# Patient Record
Sex: FEMALE | Race: WHITE | NOT HISPANIC OR LATINO | Employment: FULL TIME | URBAN - METROPOLITAN AREA
[De-identification: names, ages, dates, MRNs, and addresses within clinical notes are randomized per-mention and may not be internally consistent; named-entity substitution may affect disease eponyms.]

---

## 2024-10-03 ENCOUNTER — OFFICE VISIT (OUTPATIENT)
Dept: INTERNAL MEDICINE | Facility: CLINIC | Age: 64
End: 2024-10-03
Payer: MEDICARE

## 2024-10-03 ENCOUNTER — LAB VISIT (OUTPATIENT)
Dept: LAB | Facility: HOSPITAL | Age: 64
End: 2024-10-03
Attending: FAMILY MEDICINE
Payer: MEDICARE

## 2024-10-03 ENCOUNTER — TELEPHONE (OUTPATIENT)
Dept: OBSTETRICS AND GYNECOLOGY | Facility: CLINIC | Age: 64
End: 2024-10-03
Payer: MEDICARE

## 2024-10-03 VITALS
WEIGHT: 157.31 LBS | OXYGEN SATURATION: 95 % | SYSTOLIC BLOOD PRESSURE: 129 MMHG | HEART RATE: 80 BPM | DIASTOLIC BLOOD PRESSURE: 57 MMHG | TEMPERATURE: 96 F | HEIGHT: 65 IN | BODY MASS INDEX: 26.21 KG/M2

## 2024-10-03 DIAGNOSIS — E66.3 OVERWEIGHT: ICD-10-CM

## 2024-10-03 DIAGNOSIS — N89.8 VAGINAL IRRITATION: ICD-10-CM

## 2024-10-03 DIAGNOSIS — R42 DIZZINESS: ICD-10-CM

## 2024-10-03 DIAGNOSIS — N30.00 ACUTE CYSTITIS WITHOUT HEMATURIA: Primary | ICD-10-CM

## 2024-10-03 DIAGNOSIS — R53.83 FATIGUE, UNSPECIFIED TYPE: ICD-10-CM

## 2024-10-03 DIAGNOSIS — F17.211 CIGARETTE NICOTINE DEPENDENCE IN REMISSION: ICD-10-CM

## 2024-10-03 DIAGNOSIS — Z00.00 ANNUAL PHYSICAL EXAM: ICD-10-CM

## 2024-10-03 LAB
BILIRUB SERPL-MCNC: NORMAL MG/DL
BLOOD URINE, POC: NORMAL
CLARITY, POC UA: CLEAR
COLOR, POC UA: YELLOW
GLUCOSE UR QL STRIP: NORMAL
KETONES UR QL STRIP: NORMAL
LEUKOCYTE ESTERASE URINE, POC: NORMAL
NITRITE, POC UA: NORMAL
PH, POC UA: 7
PROTEIN, POC: NORMAL
SPECIFIC GRAVITY, POC UA: 1.01
UROBILINOGEN, POC UA: 0.2

## 2024-10-03 PROCEDURE — 81002 URINALYSIS NONAUTO W/O SCOPE: CPT | Mod: PBBFAC,PN | Performed by: FAMILY MEDICINE

## 2024-10-03 PROCEDURE — 85025 COMPLETE CBC W/AUTO DIFF WBC: CPT | Performed by: FAMILY MEDICINE

## 2024-10-03 PROCEDURE — 99204 OFFICE O/P NEW MOD 45 MIN: CPT | Mod: S$PBB,,, | Performed by: FAMILY MEDICINE

## 2024-10-03 PROCEDURE — 99204 OFFICE O/P NEW MOD 45 MIN: CPT | Mod: PBBFAC,PN | Performed by: FAMILY MEDICINE

## 2024-10-03 PROCEDURE — 84443 ASSAY THYROID STIM HORMONE: CPT | Performed by: FAMILY MEDICINE

## 2024-10-03 PROCEDURE — 99999 PR PBB SHADOW E&M-NEW PATIENT-LVL IV: CPT | Mod: PBBFAC,,, | Performed by: FAMILY MEDICINE

## 2024-10-03 PROCEDURE — 99999PBSHW POCT URINE DIPSTICK WITHOUT MICROSCOPE: Mod: PBBFAC,,,

## 2024-10-03 PROCEDURE — 80061 LIPID PANEL: CPT | Performed by: FAMILY MEDICINE

## 2024-10-03 PROCEDURE — 87086 URINE CULTURE/COLONY COUNT: CPT | Performed by: FAMILY MEDICINE

## 2024-10-03 PROCEDURE — 80053 COMPREHEN METABOLIC PANEL: CPT | Performed by: FAMILY MEDICINE

## 2024-10-03 PROCEDURE — 36415 COLL VENOUS BLD VENIPUNCTURE: CPT | Mod: PN | Performed by: FAMILY MEDICINE

## 2024-10-03 RX ORDER — SULFAMETHOXAZOLE AND TRIMETHOPRIM 800; 160 MG/1; MG/1
1 TABLET ORAL 2 TIMES DAILY
Qty: 6 TABLET | Refills: 0 | Status: SHIPPED | OUTPATIENT
Start: 2024-10-03 | End: 2024-10-06

## 2024-10-03 RX ORDER — CETIRIZINE HYDROCHLORIDE 10 MG/1
10 TABLET ORAL DAILY
COMMUNITY

## 2024-10-03 NOTE — PROGRESS NOTES
Subjective:       Patient ID: Ara Matthews is a 64 y.o. female.    Chief Complaint: Dizziness, Vaginal Itching, Abdominal Pain, and Fatigue    Ara Matthews is a 64 y.o. female who presents to clinic for Dizziness, Vaginal Itching, Abdominal Pain, and Fatigue. She reports a couple episodes of dizziness with symptoms in the evening, denies syncope and has been taking it easy. Reports symptoms last summer due to the heat and feeling dehydrated.   Reports overactive bladder symptoms with increased frequency and urgency.         Review of Systems   Constitutional:  Negative for chills and fever.   HENT:  Negative for congestion, rhinorrhea and sore throat.    Respiratory:  Negative for cough, shortness of breath and wheezing.    Cardiovascular:  Negative for chest pain, palpitations and leg swelling.   Gastrointestinal:  Negative for abdominal pain, constipation, diarrhea, nausea and vomiting.   Genitourinary:  Negative for dysuria, frequency and urgency.   Neurological:  Positive for dizziness. Negative for headaches.   Psychiatric/Behavioral:  Negative for dysphoric mood.        Objective:      Physical Exam  Vitals reviewed.   HENT:      Head: Normocephalic and atraumatic.      Nose: No congestion or rhinorrhea.   Eyes:      General: No scleral icterus.        Right eye: No discharge.         Left eye: No discharge.      Extraocular Movements: Extraocular movements intact.      Conjunctiva/sclera: Conjunctivae normal.      Pupils: Pupils are equal, round, and reactive to light.   Cardiovascular:      Rate and Rhythm: Normal rate and regular rhythm.      Heart sounds: No murmur heard.     No friction rub. No gallop.   Pulmonary:      Effort: Pulmonary effort is normal. No respiratory distress.      Breath sounds: Normal breath sounds. No stridor. No wheezing or rhonchi.   Abdominal:      General: Bowel sounds are normal. There is no distension.      Palpations: Abdomen is soft. There is no mass.       Tenderness: There is no abdominal tenderness. There is no right CVA tenderness, left CVA tenderness, guarding or rebound.      Hernia: No hernia is present.   Musculoskeletal:      Right lower leg: No edema.      Left lower leg: No edema.   Skin:     General: Skin is warm.   Neurological:      General: No focal deficit present.      Mental Status: She is alert.   Psychiatric:         Mood and Affect: Mood normal.         Behavior: Behavior normal.         Assessment:       1. Acute cystitis without hematuria    2. Vaginal irritation    3. Dizziness    4. Fatigue, unspecified type    5. Cigarette nicotine dependence in remission    6. Overweight        Plan:   1. Acute cystitis without hematuria  -     POCT URINE DIPSTICK WITHOUT MICROSCOPE  -     sulfamethoxazole-trimethoprim 800-160mg (BACTRIM DS) 800-160 mg Tab; Take 1 tablet by mouth 2 (two) times daily. for 3 days  Dispense: 6 tablet; Refill: 0  -     CBC Auto Differential; Future; Expected date: 10/03/2024  -     Comprehensive Metabolic Panel; Future; Expected date: 10/03/2024  -     CULTURE, URINE    2. Vaginal irritation  -     Ambulatory referral/consult to Obstetrics / Gynecology; Future; Expected date: 10/10/2024    3. Dizziness  -     CBC Auto Differential; Future; Expected date: 10/03/2024  -     Comprehensive Metabolic Panel; Future; Expected date: 10/03/2024    4. Fatigue, unspecified type  -     TSH; Future; Expected date: 10/03/2024    5. Cigarette nicotine dependence in remission  -     LIPID PANEL; Future; Expected date: 10/03/2024    6. Overweight  -     LIPID PANEL; Future; Expected date: 10/03/2024           Future Appointments   Date Time Provider Department Center   12/19/2024 10:30 AM Jennifer Car NP [facility] ArtJavyfinn Solorzano MD  [facility] [address]  [place] [address]  [phone]

## 2024-10-03 NOTE — TELEPHONE ENCOUNTER
Lvm for pt to return call to get appointment scheduled with our department.   Received referral for Vaginal irritation.

## 2024-10-04 LAB
ALBUMIN SERPL BCP-MCNC: 4.2 G/DL (ref 3.5–5.2)
ALP SERPL-CCNC: 72 U/L (ref 55–135)
ALT SERPL W/O P-5'-P-CCNC: 19 U/L (ref 10–44)
ANION GAP SERPL CALC-SCNC: 10 MMOL/L (ref 8–16)
AST SERPL-CCNC: 19 U/L (ref 10–40)
BASOPHILS # BLD AUTO: 0.04 K/UL (ref 0–0.2)
BASOPHILS NFR BLD: 0.6 % (ref 0–1.9)
BILIRUB SERPL-MCNC: 0.5 MG/DL (ref 0.1–1)
BUN SERPL-MCNC: 20 MG/DL (ref 8–23)
CALCIUM SERPL-MCNC: 9.8 MG/DL (ref 8.7–10.5)
CHLORIDE SERPL-SCNC: 104 MMOL/L (ref 95–110)
CHOLEST SERPL-MCNC: 193 MG/DL (ref 120–199)
CHOLEST/HDLC SERPL: 2.5 {RATIO} (ref 2–5)
CO2 SERPL-SCNC: 28 MMOL/L (ref 23–29)
CREAT SERPL-MCNC: 0.8 MG/DL (ref 0.5–1.4)
DIFFERENTIAL METHOD BLD: ABNORMAL
EOSINOPHIL # BLD AUTO: 0.1 K/UL (ref 0–0.5)
EOSINOPHIL NFR BLD: 1 % (ref 0–8)
ERYTHROCYTE [DISTWIDTH] IN BLOOD BY AUTOMATED COUNT: 12.8 % (ref 11.5–14.5)
EST. GFR  (NO RACE VARIABLE): >60 ML/MIN/1.73 M^2
GLUCOSE SERPL-MCNC: 100 MG/DL (ref 70–110)
HCT VFR BLD AUTO: 44.3 % (ref 37–48.5)
HDLC SERPL-MCNC: 76 MG/DL (ref 40–75)
HDLC SERPL: 39.4 % (ref 20–50)
HGB BLD-MCNC: 14.1 G/DL (ref 12–16)
IMM GRANULOCYTES # BLD AUTO: 0.01 K/UL (ref 0–0.04)
IMM GRANULOCYTES NFR BLD AUTO: 0.1 % (ref 0–0.5)
LDLC SERPL CALC-MCNC: 103.8 MG/DL (ref 63–159)
LYMPHOCYTES # BLD AUTO: 2 K/UL (ref 1–4.8)
LYMPHOCYTES NFR BLD: 28.5 % (ref 18–48)
MCH RBC QN AUTO: 30.4 PG (ref 27–31)
MCHC RBC AUTO-ENTMCNC: 31.8 G/DL (ref 32–36)
MCV RBC AUTO: 96 FL (ref 82–98)
MONOCYTES # BLD AUTO: 0.7 K/UL (ref 0.3–1)
MONOCYTES NFR BLD: 9.2 % (ref 4–15)
NEUTROPHILS # BLD AUTO: 4.4 K/UL (ref 1.8–7.7)
NEUTROPHILS NFR BLD: 60.6 % (ref 38–73)
NONHDLC SERPL-MCNC: 117 MG/DL
NRBC BLD-RTO: 0 /100 WBC
PLATELET # BLD AUTO: 267 K/UL (ref 150–450)
PMV BLD AUTO: 11.4 FL (ref 9.2–12.9)
POTASSIUM SERPL-SCNC: 4.3 MMOL/L (ref 3.5–5.1)
PROT SERPL-MCNC: 7.4 G/DL (ref 6–8.4)
RBC # BLD AUTO: 4.64 M/UL (ref 4–5.4)
SODIUM SERPL-SCNC: 142 MMOL/L (ref 136–145)
TRIGL SERPL-MCNC: 66 MG/DL (ref 30–150)
TSH SERPL DL<=0.005 MIU/L-ACNC: 1.21 UIU/ML (ref 0.4–4)
WBC # BLD AUTO: 7.17 K/UL (ref 3.9–12.7)

## 2024-10-05 LAB — BACTERIA UR CULT: NORMAL

## 2024-10-28 ENCOUNTER — TELEPHONE (OUTPATIENT)
Dept: OBSTETRICS AND GYNECOLOGY | Facility: CLINIC | Age: 64
End: 2024-10-28
Payer: MEDICARE

## 2025-03-19 ENCOUNTER — CLINICAL SUPPORT (OUTPATIENT)
Dept: INTERNAL MEDICINE | Facility: CLINIC | Age: 65
End: 2025-03-19
Payer: MEDICARE

## 2025-03-19 ENCOUNTER — OFFICE VISIT (OUTPATIENT)
Dept: INTERNAL MEDICINE | Facility: CLINIC | Age: 65
End: 2025-03-19
Payer: MEDICARE

## 2025-03-19 VITALS
HEART RATE: 94 BPM | RESPIRATION RATE: 18 BRPM | HEIGHT: 65 IN | TEMPERATURE: 98 F | SYSTOLIC BLOOD PRESSURE: 90 MMHG | WEIGHT: 158.5 LBS | BODY MASS INDEX: 26.41 KG/M2 | OXYGEN SATURATION: 98 % | DIASTOLIC BLOOD PRESSURE: 68 MMHG

## 2025-03-19 DIAGNOSIS — R00.0 TACHYCARDIA: Primary | ICD-10-CM

## 2025-03-19 DIAGNOSIS — R79.9 ABNORMAL FINDING OF BLOOD CHEMISTRY, UNSPECIFIED: ICD-10-CM

## 2025-03-19 DIAGNOSIS — R00.0 TACHYCARDIA: ICD-10-CM

## 2025-03-19 DIAGNOSIS — N32.81 OVERACTIVE BLADDER: ICD-10-CM

## 2025-03-19 DIAGNOSIS — R63.1 EXCESSIVE THIRST: ICD-10-CM

## 2025-03-19 DIAGNOSIS — E86.0 DEHYDRATION: Primary | ICD-10-CM

## 2025-03-19 LAB
BILIRUB SERPL-MCNC: NORMAL MG/DL
BLOOD URINE, POC: NORMAL
CLARITY, POC UA: CLEAR
COLOR, POC UA: YELLOW
GLUCOSE SERPL-MCNC: 109 MG/DL (ref 70–110)
GLUCOSE UR QL STRIP: NORMAL
KETONES UR QL STRIP: NORMAL
LEUKOCYTE ESTERASE URINE, POC: NORMAL
NITRITE, POC UA: NORMAL
PH, POC UA: 6
PROTEIN, POC: NORMAL
SPECIFIC GRAVITY, POC UA: 1.02
UROBILINOGEN, POC UA: 0.2

## 2025-03-19 PROCEDURE — 99214 OFFICE O/P EST MOD 30 MIN: CPT | Mod: PBBFAC,PN | Performed by: FAMILY MEDICINE

## 2025-03-19 PROCEDURE — 93005 ELECTROCARDIOGRAM TRACING: CPT | Mod: PBBFAC,PN | Performed by: INTERNAL MEDICINE

## 2025-03-19 PROCEDURE — 82962 GLUCOSE BLOOD TEST: CPT | Mod: PBBFAC,PN | Performed by: FAMILY MEDICINE

## 2025-03-19 PROCEDURE — 99215 OFFICE O/P EST HI 40 MIN: CPT | Mod: S$PBB,,, | Performed by: FAMILY MEDICINE

## 2025-03-19 PROCEDURE — 81002 URINALYSIS NONAUTO W/O SCOPE: CPT | Mod: PBBFAC,PN | Performed by: FAMILY MEDICINE

## 2025-03-19 PROCEDURE — 99999 PR PBB SHADOW E&M-EST. PATIENT-LVL IV: CPT | Mod: PBBFAC,,, | Performed by: FAMILY MEDICINE

## 2025-03-19 PROCEDURE — 99999PBSHW POCT URINE DIPSTICK WITHOUT MICROSCOPE: Mod: PBBFAC,,,

## 2025-03-19 PROCEDURE — 99999PBSHW POCT GLUCOSE, HAND-HELD DEVICE: Mod: PBBFAC,,,

## 2025-03-19 PROCEDURE — 93010 ELECTROCARDIOGRAM REPORT: CPT | Mod: S$PBB,,, | Performed by: INTERNAL MEDICINE

## 2025-03-19 NOTE — PROGRESS NOTES
Pt tolerated EKG without any signs of distress or complications. It was transmitted for further reading and documentation. Provider recommended pt visit ED due to symptomatic dehydration.

## 2025-03-19 NOTE — PROGRESS NOTES
"Subjective:       Patient ID: Ara Matthews is a 65 y.o. female.    Chief Complaint: Tachycardia    Ara Matthews is 65 y.o. female who present to clinic with complaints of tachycardia, dizziness. She feels generally unwell, "I feel like dookie".She has been checking her blood pressure periodically since Friday and her daughter who is a nurse assessed her symptoms on Friday and noted an irregular heart beat. Her pulse has ranged from 90-130s. She reports chronic frequent urination and thirst. Has family history of heart disease, family history of diabetes. Denies any recent injuries or falls. No new medications            Review of Systems   Constitutional:  Negative for chills and fever.   HENT:  Negative for congestion, rhinorrhea and sore throat.    Respiratory:  Negative for cough, shortness of breath and wheezing.    Cardiovascular:  Positive for palpitations. Negative for chest pain and leg swelling.   Gastrointestinal:  Negative for abdominal pain, constipation, diarrhea, nausea and vomiting.   Genitourinary:  Positive for frequency. Negative for dysuria and urgency.   Neurological:  Positive for dizziness and light-headedness. Negative for headaches.   Psychiatric/Behavioral:  Negative for dysphoric mood.        Objective:      Physical Exam  Vitals reviewed.   Constitutional:       General: She is not in acute distress.     Appearance: She is ill-appearing. She is not toxic-appearing.   HENT:      Head: Normocephalic and atraumatic.      Nose: No congestion or rhinorrhea.   Eyes:      General: No scleral icterus.        Right eye: No discharge.         Left eye: No discharge.      Extraocular Movements: Extraocular movements intact.      Conjunctiva/sclera: Conjunctivae normal.      Pupils: Pupils are equal, round, and reactive to light.   Cardiovascular:      Rate and Rhythm: Regular rhythm. Tachycardia present.      Heart sounds: No murmur heard.     No friction rub. No gallop.   Pulmonary:      " Effort: Pulmonary effort is normal. No respiratory distress.      Breath sounds: Normal breath sounds. No stridor. No wheezing or rhonchi.   Abdominal:      General: Bowel sounds are normal.      Palpations: Abdomen is soft.   Musculoskeletal:      Right lower leg: No edema.      Left lower leg: No edema.   Skin:     General: Skin is warm.      Comments: Varicose veins present bilateral lower extremity    Neurological:      General: No focal deficit present.      Mental Status: She is alert.   Psychiatric:         Mood and Affect: Mood normal.         Behavior: Behavior normal.         Assessment/Plan:       1. Tachycardia    2. Overactive bladder    3. Abnormal finding of blood chemistry, unspecified    4. Excessive thirst      1. Dehydration (Primary)  Acute, uncontrolled, recommend referral to ED for IV fluid resuscitation and cardiac monitoring   - Refer to Emergency Dept.    2. Tachycardia  no previous EKG in system for comparison rhythm is normal sinus, incomplete right bundle branch, symptoms potentially secondary to paroxysmal afib vs dehydration vs thyroid dysfunction, recommend patient seek care in ED for care  - IN OFFICE EKG 12-LEAD (to Muse)  - CBC Auto Differential; Future  - Comprehensive Metabolic Panel; Future  - Hemoglobin A1C; Future  - Lipid Panel; Future  - TSH; Future  - Ambulatory referral/consult to Cardiology; Future  - IN OFFICE EKG 12-LEAD (to Muse)    3. Overactive bladder  Chronic, uncontrolled, will assess for UTI, may benefit from new outpatient urology referral  - POCT URINE DIPSTICK WITHOUT MICROSCOPE    4. Abnormal finding of blood chemistry, unspecified  - Hemoglobin A1C; Future  - Lipid Panel; Future    5. Excessive thirst  Acute, uncontrolled, will assess glucose for hyperglycemia   - POCT Glucose, Hand-Held Device               Future Appointments   Date Time Provider Department [place]   3/20/2025  8:20 AM FLORA BENJAMIN   4/2/2025  2:20 PM Christie Solorzano  MD [facility]   4/22/2025  4:00 PM Palmer Pham MD ON CARDIO  Medical C       Christie Solorzano MD  [facility] [address]  [place] [address]  [phone]      This note was generated with the assistance of ambient listening technology. Verbal consent was obtained by the patient and accompanying visitor(s) for the recording of patient appointment to facilitate this note. I attest to having reviewed and edited the generated note for accuracy, though some syntax or spelling errors may persist. Please contact the author of this note for any clarification.

## 2025-03-20 LAB
OHS QRS DURATION: 106 MS
OHS QTC CALCULATION: 447 MS

## 2025-03-24 ENCOUNTER — PATIENT MESSAGE (OUTPATIENT)
Dept: INTERNAL MEDICINE | Facility: CLINIC | Age: 65
End: 2025-03-24
Payer: MEDICARE

## 2025-03-27 ENCOUNTER — OFFICE VISIT (OUTPATIENT)
Dept: INTERNAL MEDICINE | Facility: CLINIC | Age: 65
End: 2025-03-27
Payer: MEDICARE

## 2025-03-27 VITALS
WEIGHT: 158.63 LBS | OXYGEN SATURATION: 96 % | SYSTOLIC BLOOD PRESSURE: 100 MMHG | RESPIRATION RATE: 18 BRPM | TEMPERATURE: 97 F | HEART RATE: 70 BPM | BODY MASS INDEX: 26.43 KG/M2 | HEIGHT: 65 IN | DIASTOLIC BLOOD PRESSURE: 72 MMHG

## 2025-03-27 DIAGNOSIS — M19.049 ARTHRITIS OF HAND, UNSPECIFIED LATERALITY: ICD-10-CM

## 2025-03-27 DIAGNOSIS — R00.0 TACHYCARDIA: Primary | ICD-10-CM

## 2025-03-27 DIAGNOSIS — M62.838 MUSCLE SPASM: ICD-10-CM

## 2025-03-27 PROCEDURE — 99214 OFFICE O/P EST MOD 30 MIN: CPT | Mod: S$PBB,,, | Performed by: FAMILY MEDICINE

## 2025-03-27 PROCEDURE — 99999 PR PBB SHADOW E&M-EST. PATIENT-LVL IV: CPT | Mod: PBBFAC,,, | Performed by: FAMILY MEDICINE

## 2025-03-27 PROCEDURE — G2211 COMPLEX E/M VISIT ADD ON: HCPCS | Mod: S$PBB,,, | Performed by: FAMILY MEDICINE

## 2025-03-27 PROCEDURE — 99214 OFFICE O/P EST MOD 30 MIN: CPT | Mod: PBBFAC,PN | Performed by: FAMILY MEDICINE

## 2025-03-27 RX ORDER — TIZANIDINE 4 MG/1
4 TABLET ORAL NIGHTLY PRN
Qty: 30 TABLET | Refills: 0 | Status: SHIPPED | OUTPATIENT
Start: 2025-03-27

## 2025-03-27 RX ORDER — ACETAMINOPHEN 500 MG
1 TABLET ORAL NIGHTLY
COMMUNITY

## 2025-03-27 NOTE — PROGRESS NOTES
Subjective:       Patient ID: Ara Matthews is a 65 y.o. female.    Chief Complaint: Tachycardia    Presents for follow up of elevated heart rate. She has gone to the ED twice for elevated heart rate with a negative cardiac work up. She is interested in referral to cardiology. Denies symptoms today. She recently purchase an Apple watch with the EKG feature.       Review of Systems   Constitutional:  Negative for chills and fever.   HENT:  Negative for congestion, rhinorrhea and sore throat.    Respiratory:  Negative for cough, shortness of breath and wheezing.    Cardiovascular:  Positive for palpitations. Negative for chest pain and leg swelling.   Gastrointestinal:  Negative for abdominal pain, constipation, diarrhea, nausea and vomiting.   Genitourinary:  Negative for dysuria, frequency and urgency.   Neurological:  Negative for headaches.   Psychiatric/Behavioral:  Negative for dysphoric mood.        Objective:      Physical Exam  Vitals reviewed.   HENT:      Head: Normocephalic and atraumatic.      Nose: No congestion or rhinorrhea.   Eyes:      General: No scleral icterus.        Right eye: No discharge.         Left eye: No discharge.      Extraocular Movements: Extraocular movements intact.      Conjunctiva/sclera: Conjunctivae normal.      Pupils: Pupils are equal, round, and reactive to light.   Cardiovascular:      Rate and Rhythm: Normal rate and regular rhythm.      Heart sounds: No murmur heard.     No friction rub. No gallop.   Pulmonary:      Effort: Pulmonary effort is normal. No respiratory distress.      Breath sounds: Normal breath sounds. No stridor. No wheezing or rhonchi.   Abdominal:      General: Bowel sounds are normal.      Palpations: Abdomen is soft.   Musculoskeletal:      Right lower leg: No edema.      Left lower leg: No edema.   Skin:     General: Skin is warm.   Neurological:      General: No focal deficit present.      Mental Status: She is alert.   Psychiatric:         Mood  and Affect: Mood normal.         Behavior: Behavior normal.         Assessment:       1. Tachycardia    2. Arthritis of hand    3. Muscle spasm        Plan:   1. Tachycardia  Intermittent issues, normal rate and rhythm during exam today, concern for possible paroxysmal arrhythmia, will advise patient to continue to monitor pulse, see cardiology as scheduled.     2. Arthritis of hand  Chronic, stable, continue use of tumeric and OTC NSAIDs PRN    3. Muscle spasm  Chronic, uncontrolled   -     tiZANidine (ZANAFLEX) 4 MG tablet; Take 1 tablet (4 mg total) by mouth nightly as needed (muscle spasm).  Dispense: 30 tablet; Refill: 0    RTC 2 months for follow up      Future Appointments   Date Time Provider Department Center   4/22/2025  4:00 PM Palmer Pham MD ON CARDIO [facility]   5/7/2025 10:40 AM Christie Solorzano MD [facility]       Christie Solorzano MD  [facility] [address]  [place] [address]  [phone]

## 2025-03-27 NOTE — PROGRESS NOTES
Subjective:       Patient ID: Ara Matthews is a 65 y.o. female.    Chief Complaint: Tachycardia    Episodic issues with elevated heart rate, last 1.5 hours, feels slight lightheaded during these episodes   She recently purchased an Apple watch which has the EKG feature. Unsure how to download information      Review of Systems   Constitutional:  Negative for chills and fever.   HENT:  Negative for congestion, rhinorrhea and sore throat.    Respiratory:  Negative for cough, shortness of breath and wheezing.    Cardiovascular:  Positive for palpitations. Negative for chest pain and leg swelling.   Gastrointestinal:  Negative for abdominal pain, constipation, diarrhea, nausea and vomiting.   Genitourinary:  Negative for dysuria, frequency and urgency.   Neurological:  Negative for headaches.   Psychiatric/Behavioral:  Negative for dysphoric mood.        Objective:      Physical Exam    Assessment:       1. Tachycardia    2. Arthritis of hand        Plan:   1. Tachycardia    2. Arthritis of hand       Considered ordering a holter monitor, however patient has EKG feature on her watch, which may capture these event they occur  - follow up with cardiology as scheduled     Future Appointments   Date Time Provider Department Center   4/2/2025  2:20 PM Christie Solorzano MD [facility]   4/22/2025  4:00 PM Palmer Pham MD ON CARDIO  Medical C       Christie Solorzano MD  [facility] [address]  [place] [address]  [phone]

## 2025-04-22 ENCOUNTER — OFFICE VISIT (OUTPATIENT)
Dept: CARDIOLOGY | Facility: CLINIC | Age: 65
End: 2025-04-22
Payer: MEDICARE

## 2025-04-22 VITALS
RESPIRATION RATE: 16 BRPM | BODY MASS INDEX: 26.81 KG/M2 | HEIGHT: 65 IN | HEART RATE: 73 BPM | DIASTOLIC BLOOD PRESSURE: 66 MMHG | WEIGHT: 160.94 LBS | OXYGEN SATURATION: 95 % | SYSTOLIC BLOOD PRESSURE: 102 MMHG

## 2025-04-22 DIAGNOSIS — R06.09 OTHER FORM OF DYSPNEA: ICD-10-CM

## 2025-04-22 DIAGNOSIS — R07.9 CHEST PAIN, UNSPECIFIED TYPE: ICD-10-CM

## 2025-04-22 DIAGNOSIS — R00.2 PALPITATIONS: ICD-10-CM

## 2025-04-22 DIAGNOSIS — I47.10 SVT (SUPRAVENTRICULAR TACHYCARDIA): Primary | ICD-10-CM

## 2025-04-22 DIAGNOSIS — R00.0 TACHYCARDIA: ICD-10-CM

## 2025-04-22 PROCEDURE — 99999 PR PBB SHADOW E&M-EST. PATIENT-LVL V: CPT | Mod: PBBFAC,,, | Performed by: INTERNAL MEDICINE

## 2025-04-22 PROCEDURE — 99215 OFFICE O/P EST HI 40 MIN: CPT | Mod: PBBFAC | Performed by: INTERNAL MEDICINE

## 2025-04-22 RX ORDER — METOPROLOL SUCCINATE 25 MG/1
12.5 TABLET, EXTENDED RELEASE ORAL NIGHTLY
Qty: 45 TABLET | Refills: 3 | Status: SHIPPED | OUTPATIENT
Start: 2025-04-22 | End: 2026-04-22

## 2025-04-22 NOTE — PROGRESS NOTES
Subjective:   Patient ID:  Ara Matthews is a 65 y.o. female who presents for cardiac consult of No chief complaint on file.      Referral by: Christie Solorzano Md  [address] [facility]  [place] [address]     Reason for consult: tachycardia       HPI  The patient came in today for cardiac consult of No chief complaint on file.    25  Ara Matthews is a 65 y.o. female pt with SVT, h/o tobacco use, arthritis, diverticulitis, h/o colon resection presents for CV eval.       ER eval  Palpitations        Pt seen here recently and was instructed to return if heart rate greater than 100. Today at 4260-2861, her heart rate was in the 140's. Heart rate in 80's on triage.     65-year-old female history of arthritis, diverticulitis, colon resection presents for palpitations.  Patient states after cleaning the kitchen she had some palpitations and slight lightheadedness similar to an episode she had last week for which she was evaluated and discharged.  Denies chest pain, shortness of breath, diaphoresis, numbness, tingling, had symptoms on arrival but while waiting in the waiting room symptoms resolved.  No results found for this or any previous visit.      Pt had recent ER eval for palpitations, elevated HR.   BP low normal today. HR 70s. BMI 26   Recent ECG - NSR, inc RBBB, LAFB   Has apple watch - ECG on there resembles SVT - V rate 160 bpm.     FH - father - had MI, was a smoker and drinker       No cardiac monitor results found for the past 12 months     Normal sinus rhythm   Incomplete right bundle branch block   Left anterior fascicular block   Abnormal ECG   No previous ECGs available   Confirmed by Palmer Pham ([id number]) on 3/20/2025 3:22:16 PM     Past Medical History:   Diagnosis Date    Arthritis     GERD (gastroesophageal reflux disease)     Urinary frequency        Past Surgical History:   Procedure Laterality Date     SECTION         Social History[1]    Family History   Problem Relation Name  Age of Onset    Cancer Maternal Grandmother          oral    Colon cancer Paternal Grandmother      Cancer Paternal Grandmother          colon CA    Heart disease Father      Stroke Neg Hx      Hypertension Neg Hx      Diabetes Maternal Uncle      Cancer Cousin Paternal         colon CA       Patient's Medications   New Prescriptions    METOPROLOL SUCCINATE (TOPROL-XL) 25 MG 24 HR TABLET    Take 0.5 tablets (12.5 mg total) by mouth every evening.   Previous Medications    CETIRIZINE (ZYRTEC) 10 MG TABLET    Take 10 mg by mouth once daily.    CINNAMON BARK (CINNAMON ORAL)    Take by mouth.    CLOTRIMAZOLE-BETAMETHASONE 1-0.05% (LOTRISONE) CREAM    APPLY TOPICALLY TWICE A DAY    FEXOFENADINE (ALLEGRA) 30 MG TABLET    Take 30 mg by mouth 2 (two) times daily as needed.     FLUTICASONE (FLONASE) 50 MCG/ACTUATION NASAL SPRAY    2 sprays by Each Nostril route daily as needed.    MELATONIN (MELATIN) 5 MG    Take 1 tablet by mouth every evening.    MULTIVITAMIN (HONEY BEARS ORAL)    Take by mouth.    OMEPRAZOLE (PRILOSEC) 40 MG CAPSULE    Take 1 capsule (40 mg total) by mouth 2 (two) times daily before meals.    TIZANIDINE (ZANAFLEX) 4 MG TABLET    Take 1 tablet (4 mg total) by mouth nightly as needed (muscle spasm).    TURMERIC ORAL    Take by mouth.   Modified Medications    No medications on file   Discontinued Medications    No medications on file       Review of Systems   Constitutional:  Positive for malaise/fatigue.   HENT: Negative.     Eyes: Negative.    Respiratory:  Positive for shortness of breath.    Cardiovascular:  Positive for palpitations. Negative for leg swelling.   Gastrointestinal: Negative.    Genitourinary: Negative.    Musculoskeletal: Negative.    Skin: Negative.    Neurological: Negative.    Endo/Heme/Allergies: Negative.    Psychiatric/Behavioral: Negative.     All 12 systems otherwise negative.      Wt Readings from Last 3 Encounters:   04/22/25 73 kg (160 lb 15 oz)   04/03/25 72.5 kg (159 lb 13.3  "oz)   03/27/25 72 kg (158 lb 9.9 oz)     Temp Readings from Last 3 Encounters:   04/03/25 96.5 °F (35.8 °C) (Tympanic)   03/27/25 96.6 °F (35.9 °C) (Tympanic)   03/19/25 98 °F (36.7 °C) (Tympanic)     BP Readings from Last 3 Encounters:   04/22/25 102/66   04/03/25 115/70   03/27/25 100/72     Pulse Readings from Last 3 Encounters:   04/22/25 73   04/03/25 64   03/27/25 70       /66   Pulse 73   Resp 16   Ht 5' 5" (1.651 m)   Wt 73 kg (160 lb 15 oz)   LMP 04/29/2003   SpO2 95%   BMI 26.78 kg/m²     Objective:   Physical Exam  Vitals and nursing note reviewed.   Constitutional:       General: She is not in acute distress.     Appearance: She is well-developed. She is not diaphoretic.   HENT:      Head: Normocephalic and atraumatic.      Nose: Nose normal.   Eyes:      General: No scleral icterus.     Conjunctiva/sclera: Conjunctivae normal.   Neck:      Thyroid: No thyromegaly.      Vascular: No JVD.   Cardiovascular:      Rate and Rhythm: Normal rate and regular rhythm.      Heart sounds: S1 normal and S2 normal. No murmur heard.     No friction rub. No gallop. No S3 or S4 sounds.   Pulmonary:      Effort: Pulmonary effort is normal. No respiratory distress.      Breath sounds: Normal breath sounds. No stridor. No wheezing or rales.   Chest:      Chest wall: No tenderness.   Abdominal:      General: Bowel sounds are normal. There is no distension.      Palpations: Abdomen is soft. There is no mass.      Tenderness: There is no abdominal tenderness. There is no rebound.   Genitourinary:     Comments: Deferred  Musculoskeletal:         General: No tenderness or deformity. Normal range of motion.      Cervical back: Normal range of motion and neck supple.   Lymphadenopathy:      Cervical: No cervical adenopathy.   Skin:     General: Skin is warm and dry.      Coloration: Skin is not pale.      Findings: No erythema or rash.   Neurological:      Mental Status: She is alert and oriented to person, place, and " time.      Motor: No abnormal muscle tone.      Coordination: Coordination normal.   Psychiatric:         Behavior: Behavior normal.         Thought Content: Thought content normal.         Judgment: Judgment normal.         Lab Results   Component Value Date     10/03/2024    K 4.3 10/03/2024     10/03/2024    CO2 28 10/03/2024    BUN 20 10/03/2024    CREATININE 0.8 10/03/2024     10/03/2024    AST 19 10/03/2024    ALT 19 10/03/2024    ALBUMIN 4.2 10/03/2024    PROT 7.4 10/03/2024    BILITOT 0.5 10/03/2024    WBC 7.17 10/03/2024    HGB Negative 2025    HGB 14.1 10/03/2024    HCT 44.3 10/03/2024    MCV 96 10/03/2024     10/03/2024    INR 1 2025    TSH 1.296 2025    TSH 1.207 10/03/2024    CHOL 193 10/03/2024    HDL 76 (H) 10/03/2024    LDLCALC 103.8 10/03/2024    TRIG 66 10/03/2024    BNP 32 2025         BNP (PG/ML)   Date Value   2025 32   2025 49     INR (no units)   Date Value   2025 1          Assessment:      1. SVT (supraventricular tachycardia)    2. Tachycardia    3. Palpitations    4. Chest pain, unspecified type    5. Other form of dyspnea        Plan:     Tachycardia, palpitations with SVT on Apple Watch  - neg CV work up in ER recently  - start Toprol 12.5 mg QHS   - increase water/liquid IV   - order exercise nuclear stress test  - order ECHO  - order 7 day vital monitor  - refer to EP - discuss ablation     2. OA - joints  - cont tx PRN       Thank you for allowing me to participate in this patient's care. Please do not hesitate to contact me with any questions or concerns. Consult note has been forwarded to the referral physician.          [1]   Social History  Tobacco Use    Smoking status: Former     Current packs/day: 0.00     Types: Cigarettes     Quit date: 2008     Years since quittin.9    Smokeless tobacco: Never   Substance Use Topics    Alcohol use: No    Drug use: No

## 2025-04-25 ENCOUNTER — TELEPHONE (OUTPATIENT)
Dept: CARDIOLOGY | Facility: HOSPITAL | Age: 65
End: 2025-04-25
Payer: MEDICARE

## 2025-04-25 NOTE — TELEPHONE ENCOUNTER
Returned call. Patient requesting to reschedule Nuclear Stress test and Echo at [facility].  Patient was instructed will send message for hospital scheduling staff to give a call, due to I don't ave hospital scheduling privileges.   Patient verbalized understanding.

## 2025-05-01 DIAGNOSIS — M62.838 MUSCLE SPASM: ICD-10-CM

## 2025-05-02 ENCOUNTER — PATIENT OUTREACH (OUTPATIENT)
Dept: ADMINISTRATIVE | Facility: HOSPITAL | Age: 65
End: 2025-05-02
Payer: MEDICARE

## 2025-05-02 DIAGNOSIS — Z12.31 ENCOUNTER FOR SCREENING MAMMOGRAM FOR MALIGNANT NEOPLASM OF BREAST: Primary | ICD-10-CM

## 2025-05-02 RX ORDER — TIZANIDINE 4 MG/1
4 TABLET ORAL NIGHTLY PRN
Qty: 30 TABLET | Refills: 0 | Status: SHIPPED | OUTPATIENT
Start: 2025-05-02

## 2025-05-07 ENCOUNTER — PATIENT MESSAGE (OUTPATIENT)
Dept: INTERNAL MEDICINE | Facility: CLINIC | Age: 65
End: 2025-05-07
Payer: MEDICARE

## 2025-05-12 ENCOUNTER — OFFICE VISIT (OUTPATIENT)
Dept: INTERNAL MEDICINE | Facility: CLINIC | Age: 65
End: 2025-05-12
Payer: MEDICARE

## 2025-05-12 VITALS
TEMPERATURE: 97 F | RESPIRATION RATE: 18 BRPM | WEIGHT: 161.19 LBS | HEART RATE: 66 BPM | SYSTOLIC BLOOD PRESSURE: 110 MMHG | OXYGEN SATURATION: 95 % | DIASTOLIC BLOOD PRESSURE: 65 MMHG | HEIGHT: 65 IN | BODY MASS INDEX: 26.85 KG/M2

## 2025-05-12 DIAGNOSIS — Z13.1 SCREENING FOR DIABETES MELLITUS: ICD-10-CM

## 2025-05-12 DIAGNOSIS — R00.0 TACHYCARDIA: ICD-10-CM

## 2025-05-12 DIAGNOSIS — Z78.0 POST-MENOPAUSAL: ICD-10-CM

## 2025-05-12 DIAGNOSIS — R73.09 ABNORMAL GLUCOSE: ICD-10-CM

## 2025-05-12 DIAGNOSIS — Z12.11 SCREENING FOR COLON CANCER: ICD-10-CM

## 2025-05-12 DIAGNOSIS — Z11.59 ENCOUNTER FOR HEPATITIS C SCREENING TEST FOR LOW RISK PATIENT: Primary | ICD-10-CM

## 2025-05-12 PROCEDURE — 99214 OFFICE O/P EST MOD 30 MIN: CPT | Mod: S$PBB,,, | Performed by: FAMILY MEDICINE

## 2025-05-12 PROCEDURE — 99215 OFFICE O/P EST HI 40 MIN: CPT | Mod: PBBFAC,PN | Performed by: FAMILY MEDICINE

## 2025-05-12 PROCEDURE — 99999 PR PBB SHADOW E&M-EST. PATIENT-LVL V: CPT | Mod: PBBFAC,,, | Performed by: FAMILY MEDICINE

## 2025-05-12 PROCEDURE — G2211 COMPLEX E/M VISIT ADD ON: HCPCS | Mod: S$PBB,,, | Performed by: FAMILY MEDICINE

## 2025-05-12 NOTE — PROGRESS NOTES
Subjective:       Patient ID: Ara Matthews is a 65 y.o. female.    Chief Complaint: Follow-up (Follow up on previous visit from cardiologist and starting new medication)    Ara Matthews is 65 y.o. female who presents for follow up. Reports doing well. Started on metoprolol by cardiology since LOV.       Review of Systems   Constitutional:  Negative for chills and fever.   HENT:  Negative for congestion, rhinorrhea and sore throat.    Respiratory:  Negative for cough, shortness of breath and wheezing.    Cardiovascular:  Negative for chest pain, palpitations and leg swelling.   Gastrointestinal:  Negative for abdominal pain, constipation, diarrhea, nausea and vomiting.   Genitourinary:  Negative for dysuria, frequency and urgency.   Neurological:  Negative for headaches.   Psychiatric/Behavioral:  Negative for dysphoric mood.        Objective:      Physical Exam  Vitals reviewed.   HENT:      Head: Normocephalic and atraumatic.      Nose: No congestion or rhinorrhea.   Eyes:      General: No scleral icterus.        Right eye: No discharge.         Left eye: No discharge.      Extraocular Movements: Extraocular movements intact.      Conjunctiva/sclera: Conjunctivae normal.      Pupils: Pupils are equal, round, and reactive to light.   Cardiovascular:      Rate and Rhythm: Normal rate and regular rhythm.      Heart sounds: No murmur heard.     No friction rub. No gallop.   Pulmonary:      Effort: Pulmonary effort is normal. No respiratory distress.      Breath sounds: Normal breath sounds. No stridor. No wheezing or rhonchi.   Abdominal:      General: Bowel sounds are normal.      Palpations: Abdomen is soft.   Musculoskeletal:      Right lower leg: No edema.      Left lower leg: No edema.   Skin:     General: Skin is warm.   Neurological:      General: No focal deficit present.      Mental Status: She is alert.   Psychiatric:         Mood and Affect: Mood normal.         Behavior: Behavior normal.          Assessment:       1. Encounter for hepatitis C screening test for low risk patient    2. Screening for colon cancer    3. Post-menopausal    4. Screening for diabetes mellitus    5. Abnormal glucose        Plan:   1. Encounter for hepatitis C screening test for low risk patient  -     Hepatitis C Antibody; Future; Expected date: 05/12/2025    2. Screening for colon cancer  -     Ambulatory referral/consult to Endo Procedure ; Future; Expected date: 05/13/2025    3. Post-menopausal  -     DXA Bone Density Axial Skeleton 1 or more sites; Future; Expected date: 05/12/2025    4. Screening for diabetes mellitus  -     Hemoglobin A1C; Future; Expected date: 05/12/2025    5. Abnormal glucose  -     Hemoglobin A1C; Future; Expected date: 05/12/2025    6. Tachycardia  Chronic, stable, continue current Rx meds       Future Appointments   Date Time Provider Department Center   6/2/2025  1:00 PM Malik Parsons MD ON CARDIO  [facility]   6/10/2025 10:00 AM [address] NUCLEAR [place]   6/10/2025 10:45 AM ECHO, INPATIENT [facility] IP ECHO [place]   6/10/2025 11:00 AM TREADMILL, [facility] CP DIAG [place]   6/10/2025  1:00 PM [address] NUCLEAR [place]   6/10/2025  2:00 PM HOLTERCHARLES SPECC[facility]   8/5/2025  2:20 PM Palmer Pham MD ON CARDIO [facility]   8/12/2025  3:40 PM Christie Solorzano MD [facility]       Christie Solorzano MD  [facility] [address]  [place] [address]  [phone]    99/55

## 2025-05-15 ENCOUNTER — HOSPITAL ENCOUNTER (OUTPATIENT)
Dept: RADIOLOGY | Facility: HOSPITAL | Age: 65
Discharge: HOME | End: 2025-05-15
Attending: FAMILY MEDICINE
Payer: MEDICARE

## 2025-05-15 DIAGNOSIS — Z12.31 ENCOUNTER FOR SCREENING MAMMOGRAM FOR MALIGNANT NEOPLASM OF BREAST: ICD-10-CM

## 2025-05-15 PROCEDURE — 77063 BREAST TOMOSYNTHESIS BI: CPT | Mod: TC

## 2025-05-26 PROBLEM — R00.0 TACHYCARDIA: Status: ACTIVE | Noted: 2025-05-26

## 2025-06-09 ENCOUNTER — TELEPHONE (OUTPATIENT)
Dept: INTERNAL MEDICINE | Facility: CLINIC | Age: 65
End: 2025-06-09
Payer: MEDICARE

## 2025-06-09 DIAGNOSIS — M62.838 MUSCLE SPASM: ICD-10-CM

## 2025-06-09 RX ORDER — TIZANIDINE 4 MG/1
4 TABLET ORAL NIGHTLY PRN
Qty: 30 TABLET | Refills: 0 | Status: SHIPPED | OUTPATIENT
Start: 2025-06-09

## 2025-06-09 NOTE — TELEPHONE ENCOUNTER
Left message on patient voicemail letting her know radiology states it can take up to 6 weeks to get mammo results

## 2025-06-10 ENCOUNTER — HOSPITAL ENCOUNTER (OUTPATIENT)
Dept: CARDIOLOGY | Facility: HOSPITAL | Age: 65
Discharge: HOME | End: 2025-06-10
Attending: INTERNAL MEDICINE
Payer: MEDICARE

## 2025-06-10 ENCOUNTER — HOSPITAL ENCOUNTER (OUTPATIENT)
Dept: RADIOLOGY | Facility: HOSPITAL | Age: 65
Discharge: HOME | End: 2025-06-10
Attending: INTERNAL MEDICINE
Payer: MEDICARE

## 2025-06-10 ENCOUNTER — RESULTS FOLLOW-UP (OUTPATIENT)
Dept: CARDIOLOGY | Facility: CLINIC | Age: 65
End: 2025-06-10

## 2025-06-10 ENCOUNTER — HOSPITAL ENCOUNTER (OUTPATIENT)
Dept: PULMONOLOGY | Facility: HOSPITAL | Age: 65
Discharge: HOME | End: 2025-06-10
Attending: INTERNAL MEDICINE
Payer: MEDICARE

## 2025-06-10 VITALS
WEIGHT: 161 LBS | HEIGHT: 65 IN | SYSTOLIC BLOOD PRESSURE: 116 MMHG | BODY MASS INDEX: 26.82 KG/M2 | DIASTOLIC BLOOD PRESSURE: 66 MMHG | HEART RATE: 60 BPM

## 2025-06-10 VITALS
SYSTOLIC BLOOD PRESSURE: 102 MMHG | DIASTOLIC BLOOD PRESSURE: 66 MMHG | HEIGHT: 65 IN | WEIGHT: 161 LBS | BODY MASS INDEX: 26.82 KG/M2

## 2025-06-10 DIAGNOSIS — R00.2 PALPITATIONS: ICD-10-CM

## 2025-06-10 DIAGNOSIS — R06.09 OTHER FORM OF DYSPNEA: ICD-10-CM

## 2025-06-10 DIAGNOSIS — R00.0 TACHYCARDIA: ICD-10-CM

## 2025-06-10 DIAGNOSIS — I47.10 SVT (SUPRAVENTRICULAR TACHYCARDIA): ICD-10-CM

## 2025-06-10 DIAGNOSIS — R07.9 CHEST PAIN, UNSPECIFIED TYPE: ICD-10-CM

## 2025-06-10 LAB
AORTIC ROOT ANNULUS: 2.9 CM
AORTIC SIZE INDEX: 1.7 CM/M2
ASCENDING AORTA: 3.1 CM
AV INDEX (PROSTH): 0.76
AV MEAN GRADIENT: 3 MMHG
AV PEAK GRADIENT: 5 MMHG
AV VALVE AREA BY VELOCITY RATIO: 3.5 CM²
AV VALVE AREA: 2.9 CM²
AV VELOCITY RATIO: 0.91
BSA FOR ECHO PROCEDURE: 1.83 M2
CV ECHO LV RWT: 0.3 CM
CV STRESS BASE HR: 59 BPM
DIASTOLIC BLOOD PRESSURE: 66 MMHG
DOP CALC AO PEAK VEL: 1.1 M/S
DOP CALC AO VTI: 30.7 CM
DOP CALC LVOT AREA: 3.8 CM2
DOP CALC LVOT DIAMETER: 2.2 CM
DOP CALC LVOT PEAK VEL: 1 M/S
DOP CALC LVOT STROKE VOLUME: 88.1 CM3
DOP CALC RVOT PEAK VEL: 0.69 M/S
DOP CALC RVOT VTI: 19.8 CM
DOP CALCLVOT PEAK VEL VTI: 23.2 CM
E WAVE DECELERATION TIME: 260 MSEC
E/A RATIO: 0.94
E/E' RATIO: 6 M/S
ECHO LV POSTERIOR WALL: 0.7 CM (ref 0.6–1.1)
EJECTION FRACTION- HIGH: 73 %
EJECTION FRACTION: 60 %
END DIASTOLIC INDEX-HIGH: 165 ML/M2
END DIASTOLIC INDEX-LOW: 101 ML/M2
END SYSTOLIC INDEX-HIGH: 64 ML/M2
END SYSTOLIC INDEX-LOW: 28 ML/M2
FRACTIONAL SHORTENING: 39.1 % (ref 28–44)
INTERVENTRICULAR SEPTUM: 0.8 CM (ref 0.6–1.1)
IVC DIAMETER: 2.06 CM
IVRT: 114 MSEC
LA MAJOR: 5.2 CM
LA MINOR: 3.8 CM
LA WIDTH: 3.4 CM
LEFT ATRIUM AREA SYSTOLIC (APICAL 2 CHAMBER): 16.35 CM2
LEFT ATRIUM AREA SYSTOLIC (APICAL 4 CHAMBER): 12.77 CM2
LEFT ATRIUM SIZE: 3.1 CM
LEFT ATRIUM VOLUME INDEX MOD: 19 ML/M2
LEFT ATRIUM VOLUME INDEX: 22 ML/M2
LEFT ATRIUM VOLUME MOD: 34 ML
LEFT ATRIUM VOLUME: 39 CM3
LEFT INTERNAL DIMENSION IN SYSTOLE: 2.8 CM (ref 2.1–4)
LEFT VENTRICLE DIASTOLIC VOLUME INDEX: 55 ML/M2
LEFT VENTRICLE DIASTOLIC VOLUME: 99 ML
LEFT VENTRICLE END SYSTOLIC VOLUME APICAL 2 CHAMBER: 48.98 ML
LEFT VENTRICLE END SYSTOLIC VOLUME APICAL 4 CHAMBER: 25.34 ML
LEFT VENTRICLE MASS INDEX: 60.3 G/M2
LEFT VENTRICLE SYSTOLIC VOLUME INDEX: 16.7 ML/M2
LEFT VENTRICLE SYSTOLIC VOLUME: 30 ML
LEFT VENTRICULAR INTERNAL DIMENSION IN DIASTOLE: 4.6 CM (ref 3.5–6)
LEFT VENTRICULAR MASS: 108.5 G
LV LATERAL E/E' RATIO: 5.2 M/S
LV SEPTAL E/E' RATIO: 6.1 M/S
LVED V (TEICH): 98.56 ML
LVES V (TEICH): 30.36 ML
LVOT MG: 1.78 MMHG
LVOT MV: 0.6 CM/S
MV PEAK A VEL: 0.78 M/S
MV PEAK E VEL: 0.73 M/S
NUC REST EJECTION FRACTION: 69
NUC STRESS EJECTION FRACTION: 74 %
OHS CV CPX 85 PERCENT MAX PREDICTED HEART RATE MALE: 130
OHS CV CPX MAX PREDICTED HEART RATE: 153
OHS CV CPX PATIENT IS FEMALE: 1
OHS CV CPX PATIENT IS MALE: 0
OHS CV CPX PEAK DIASTOLIC BLOOD PRESSURE: 78 MMHG
OHS CV CPX PEAK HEAR RATE: 95 BPM
OHS CV CPX PEAK RATE PRESSURE PRODUCT: NORMAL
OHS CV CPX PEAK SYSTOLIC BLOOD PRESSURE: 131 MMHG
OHS CV CPX PERCENT MAX PREDICTED HEART RATE ACHIEVED: 65
OHS CV CPX RATE PRESSURE PRODUCT PRESENTING: 6844
OHS CV INITIAL DOSE: 10.1 MCG/KG/MIN
OHS CV PEAK DOSE: 32 MCG/KG/MIN
OHS CV RV/LV RATIO: 0.67 CM
PISA TR MAX VEL: 2.4 M/S
PV MEAN GRADIENT: 1 MMHG
RA MAJOR: 4.27 CM
RA PRESSURE ESTIMATED: 3 MMHG
RA WIDTH: 3.7 CM
RETIRED EF AND QEF - SEE NOTES: 59 %
RIGHT VENTRICLE DIASTOLIC BASEL DIMENSION: 3.1 CM
RV TB RVSP: 5 MMHG
STJ: 2.5 CM
SYSTOLIC BLOOD PRESSURE: 116 MMHG
TDI LATERAL: 0.14 M/S
TDI SEPTAL: 0.12 M/S
TDI: 0.13 M/S
TR MAX PG: 22 MMHG
TRICUSPID ANNULAR PLANE SYSTOLIC EXCURSION: 2.3 CM
TV REST PULMONARY ARTERY PRESSURE: 26 MMHG
Z-SCORE OF LEFT VENTRICULAR DIMENSION IN END DIASTOLE: -0.79
Z-SCORE OF LEFT VENTRICULAR DIMENSION IN END SYSTOLE: -0.74

## 2025-06-10 PROCEDURE — 93306 TTE W/DOPPLER COMPLETE: CPT

## 2025-06-10 PROCEDURE — A9502 TC99M TETROFOSMIN: HCPCS | Performed by: INTERNAL MEDICINE

## 2025-06-10 PROCEDURE — 93017 CV STRESS TEST TRACING ONLY: CPT

## 2025-06-10 PROCEDURE — 63600175 PHARM REV CODE 636 W HCPCS: Performed by: INTERNAL MEDICINE

## 2025-06-10 RX ORDER — REGADENOSON 0.08 MG/ML
0.4 INJECTION, SOLUTION INTRAVENOUS ONCE
Status: COMPLETED | OUTPATIENT
Start: 2025-06-10 | End: 2025-06-10

## 2025-06-10 RX ADMIN — TETROFOSMIN 32 MILLICURIE: 1.38 INJECTION, POWDER, LYOPHILIZED, FOR SOLUTION INTRAVENOUS at 11:06

## 2025-06-10 RX ADMIN — REGADENOSON 0.4 MG: 0.08 INJECTION, SOLUTION INTRAVENOUS at 11:06

## 2025-06-10 RX ADMIN — TETROFOSMIN 10.1 MILLICURIE: 1.38 INJECTION, POWDER, LYOPHILIZED, FOR SOLUTION INTRAVENOUS at 09:06

## 2025-06-12 ENCOUNTER — RESULTS FOLLOW-UP (OUTPATIENT)
Dept: INTERNAL MEDICINE | Facility: CLINIC | Age: 65
End: 2025-06-12

## 2025-06-17 ENCOUNTER — TELEPHONE (OUTPATIENT)
Dept: CARDIOLOGY | Facility: CLINIC | Age: 65
End: 2025-06-17
Payer: MEDICARE

## 2025-06-17 NOTE — TELEPHONE ENCOUNTER
Patient states she has an appointment eith Dr. Pham on 8/5/25 and would like to know if the procedure Dr. Pham ordered can be done at that time. Patient has been advised the order is for a consult with an Electrophysiologist not a procedure. Advised patient there are not available appointments with an electrophysiologist on 8/5/25. Patient states she only wants to go to [facility] location at 11;00 A.M. for appointment. Appointment has been scheduled with Dr. Joseph Burton on 8/27/25 at 11 A.M.  Patient verbalizes understanding of all appointment information .

## 2025-06-23 DIAGNOSIS — Z00.00 ENCOUNTER FOR MEDICARE ANNUAL WELLNESS EXAM: ICD-10-CM

## 2025-07-15 DIAGNOSIS — M62.838 MUSCLE SPASM: ICD-10-CM

## 2025-07-15 RX ORDER — TIZANIDINE 4 MG/1
4 TABLET ORAL NIGHTLY PRN
Qty: 30 TABLET | Refills: 0 | Status: SHIPPED | OUTPATIENT
Start: 2025-07-15

## 2025-07-30 ENCOUNTER — LAB VISIT (OUTPATIENT)
Dept: LAB | Facility: HOSPITAL | Age: 65
End: 2025-07-30
Attending: FAMILY MEDICINE
Payer: MEDICARE

## 2025-07-30 DIAGNOSIS — R73.09 ABNORMAL GLUCOSE: ICD-10-CM

## 2025-07-30 DIAGNOSIS — Z11.59 ENCOUNTER FOR HEPATITIS C SCREENING TEST FOR LOW RISK PATIENT: ICD-10-CM

## 2025-07-30 DIAGNOSIS — R00.0 TACHYCARDIA: ICD-10-CM

## 2025-07-30 DIAGNOSIS — Z13.1 SCREENING FOR DIABETES MELLITUS: ICD-10-CM

## 2025-07-30 LAB
ABSOLUTE EOSINOPHIL (OHS): 0.11 K/UL
ABSOLUTE MONOCYTE (OHS): 0.63 K/UL (ref 0.3–1)
ABSOLUTE NEUTROPHIL COUNT (OHS): 3.18 K/UL (ref 1.8–7.7)
ALBUMIN SERPL BCP-MCNC: 4 G/DL (ref 3.5–5.2)
ALP SERPL-CCNC: 66 UNIT/L (ref 40–150)
ALT SERPL W/O P-5'-P-CCNC: 15 UNIT/L (ref 0–55)
ANION GAP (OHS): 7 MMOL/L (ref 8–16)
AST SERPL-CCNC: 22 UNIT/L (ref 0–50)
BASOPHILS # BLD AUTO: 0.03 K/UL
BASOPHILS NFR BLD AUTO: 0.5 %
BILIRUB SERPL-MCNC: 0.6 MG/DL (ref 0.1–1)
BUN SERPL-MCNC: 18 MG/DL (ref 8–23)
CALCIUM SERPL-MCNC: 8.9 MG/DL (ref 8.7–10.5)
CHLORIDE SERPL-SCNC: 106 MMOL/L (ref 95–110)
CO2 SERPL-SCNC: 28 MMOL/L (ref 23–29)
CREAT SERPL-MCNC: 0.7 MG/DL (ref 0.5–1.4)
EAG (OHS): 111 MG/DL (ref 68–131)
ERYTHROCYTE [DISTWIDTH] IN BLOOD BY AUTOMATED COUNT: 12.7 % (ref 11.5–14.5)
GFR SERPLBLD CREATININE-BSD FMLA CKD-EPI: >60 ML/MIN/1.73/M2
GLUCOSE SERPL-MCNC: 91 MG/DL (ref 70–110)
HBA1C MFR BLD: 5.5 % (ref 4–5.6)
HCT VFR BLD AUTO: 40 % (ref 37–48.5)
HCV AB SERPL QL IA: NORMAL
HGB BLD-MCNC: 12.9 GM/DL (ref 12–16)
IMM GRANULOCYTES # BLD AUTO: 0.02 K/UL (ref 0–0.04)
IMM GRANULOCYTES NFR BLD AUTO: 0.4 % (ref 0–0.5)
LYMPHOCYTES # BLD AUTO: 1.6 K/UL (ref 1–4.8)
MCH RBC QN AUTO: 30.6 PG (ref 27–31)
MCHC RBC AUTO-ENTMCNC: 32.3 G/DL (ref 32–36)
MCV RBC AUTO: 95 FL (ref 82–98)
NUCLEATED RBC (/100WBC) (OHS): 0 /100 WBC
PLATELET # BLD AUTO: 248 K/UL (ref 150–450)
PMV BLD AUTO: 11.4 FL (ref 9.2–12.9)
POTASSIUM SERPL-SCNC: 4.5 MMOL/L (ref 3.5–5.1)
PROT SERPL-MCNC: 6.6 GM/DL (ref 6–8.4)
RBC # BLD AUTO: 4.21 M/UL (ref 4–5.4)
RELATIVE EOSINOPHIL (OHS): 2 %
RELATIVE LYMPHOCYTE (OHS): 28.7 % (ref 18–48)
RELATIVE MONOCYTE (OHS): 11.3 % (ref 4–15)
RELATIVE NEUTROPHIL (OHS): 57.1 % (ref 38–73)
SODIUM SERPL-SCNC: 141 MMOL/L (ref 136–145)
TSH SERPL-ACNC: 1.96 UIU/ML (ref 0.4–4)
WBC # BLD AUTO: 5.57 K/UL (ref 3.9–12.7)

## 2025-07-30 PROCEDURE — 86803 HEPATITIS C AB TEST: CPT

## 2025-07-30 PROCEDURE — 36415 COLL VENOUS BLD VENIPUNCTURE: CPT | Mod: PN

## 2025-07-30 PROCEDURE — 84443 ASSAY THYROID STIM HORMONE: CPT

## 2025-07-30 PROCEDURE — 83036 HEMOGLOBIN GLYCOSYLATED A1C: CPT

## 2025-07-30 PROCEDURE — 80053 COMPREHEN METABOLIC PANEL: CPT

## 2025-07-30 PROCEDURE — 85025 COMPLETE CBC W/AUTO DIFF WBC: CPT

## 2025-08-05 ENCOUNTER — OFFICE VISIT (OUTPATIENT)
Dept: CARDIOLOGY | Facility: CLINIC | Age: 65
End: 2025-08-05
Payer: MEDICARE

## 2025-08-05 VITALS
RESPIRATION RATE: 16 BRPM | DIASTOLIC BLOOD PRESSURE: 65 MMHG | OXYGEN SATURATION: 95 % | SYSTOLIC BLOOD PRESSURE: 100 MMHG | HEIGHT: 65 IN | HEART RATE: 65 BPM | BODY MASS INDEX: 27.25 KG/M2 | WEIGHT: 163.56 LBS

## 2025-08-05 DIAGNOSIS — R06.09 OTHER FORM OF DYSPNEA: ICD-10-CM

## 2025-08-05 DIAGNOSIS — R00.2 PALPITATIONS: ICD-10-CM

## 2025-08-05 DIAGNOSIS — R00.0 TACHYCARDIA: Primary | ICD-10-CM

## 2025-08-05 DIAGNOSIS — R07.9 CHEST PAIN, UNSPECIFIED TYPE: ICD-10-CM

## 2025-08-05 PROCEDURE — G2211 COMPLEX E/M VISIT ADD ON: HCPCS | Mod: ,,, | Performed by: INTERNAL MEDICINE

## 2025-08-05 PROCEDURE — 99999 PR PBB SHADOW E&M-EST. PATIENT-LVL IV: CPT | Mod: PBBFAC,,, | Performed by: INTERNAL MEDICINE

## 2025-08-05 PROCEDURE — 99214 OFFICE O/P EST MOD 30 MIN: CPT | Mod: S$PBB,,, | Performed by: INTERNAL MEDICINE

## 2025-08-05 PROCEDURE — 99214 OFFICE O/P EST MOD 30 MIN: CPT | Mod: PBBFAC | Performed by: INTERNAL MEDICINE

## 2025-08-05 NOTE — PROGRESS NOTES
Subjective:   Patient ID:  Ara Matthews is a 65 y.o. female who presents for cardiac consult of No chief complaint on file.      Referral by: No referring provider defined for this encounter.     Reason for consult: tachycardia       HPI  The patient came in today for cardiac consult of No chief complaint on file.      Ara Matthews is a 65 y.o. female pt with SVT, h/o tobacco use, arthritis, diverticulitis, h/o colon resection presents for CV eval.     4/22/25    ER eval  Palpitations        Pt seen here recently and was instructed to return if heart rate greater than 100. Today at [id number], her heart rate was in the 140's. Heart rate in 80's on triage.     65-year-old female history of arthritis, diverticulitis, colon resection presents for palpitations.  Patient states after cleaning the kitchen she had some palpitations and slight lightheadedness similar to an episode she had last week for which she was evaluated and discharged.  Denies chest pain, shortness of breath, diaphoresis, numbness, tingling, had symptoms on arrival but while waiting in the waiting room symptoms resolved.  No results found for this or any previous visit.    Pt had recent ER eval for palpitations, elevated HR.   BP low normal today. HR 70s. BMI 26   Recent ECG - NSR, inc RBBB, LAFB   Has apple watch - ECG on there resembles SVT - V rate 160 bpm.     8/5/25  ECHO and Nuc stress 6/2025 negative with normal EF, PASP 26 mmHg.   Vital monitor eng 6/2025  BP low normal. HR 60s. BMI 27 - 163 lbs   Doing well with metoprolol now, no further palpitations.       FH - father - had MI, was a smoker and drinker     Results for orders placed during the hospital encounter of 06/10/25    Echo    Interpretation Summary    Left Ventricle: The left ventricle is normal in size. Normal wall thickness. There is normal systolic function with a visually estimated ejection fraction of 60 - 65%. Ejection fraction is approximately 60%. There is normal  diastolic function.    Right Ventricle: The right ventricle is normal in size Wall thickness is normal. Systolic function is normal.    Pulmonary Artery: The estimated pulmonary artery systolic pressure is 26 mmHg.    IVC/SVC: Normal venous pressure at 3 mmHg.      Results for orders placed during the hospital encounter of 06/10/25    Nuclear Stress - Cardiology Interpreted    Interpretation Summary    Normal myocardial perfusion scan. There is no evidence of myocardial ischemia or infarction.    The gated perfusion images showed an ejection fraction of 69% at rest. The gated perfusion images showed an ejection fraction of 74% post stress. Normal ejection fraction is greater than 59%.    The ECG portion of the study is negative for ischemia.    There were no arrhythmias during stress.      Cardiac Monitor - 3-15 Day Adult (Cupid Only)  Result Date: 2025  Start date 06/10/2025; recording length 5d 21h.  The underlying rhythm was Sinus, with a minimum heart rate of 45 bpm (at   Day :31:18 am),  maximum heart rate of 127 bpm (at Day 3 / 02:08:00 pm), and an average   heart rate of 66 bpm.  PAC total: 676 (0.14 %). Supraventricular Couplet Total: 21.  SVT Event(s) Total: 8. The longest SVT run was 13 beats at Day :58:27 pm. The fastest SVT run was 136  bpm at Day :02:06 pm. The total burden for SVT runs 30 seconds or   greater in duration is: 0 % and  the burden for SVT episodes shorter than 30 seconds is: < 0.01 %.  PVC total: 61 (0.01 %). PVC Morphologies Total: 2. Ventricular Couplets   Total: 1.           Normal sinus rhythm   Incomplete right bundle branch block   Left anterior fascicular block   Abnormal ECG   No previous ECGs available   Confirmed by Palmer Pham ([id number]) on 3/20/2025 3:22:16 PM     Past Medical History:   Diagnosis Date    Arthritis     GERD (gastroesophageal reflux disease)     Urinary frequency        Past Surgical History:   Procedure Laterality Date     SECTION          Social History[1]    Family History   Problem Relation Name Age of Onset    Cancer Maternal Grandmother          oral    Colon cancer Paternal Grandmother      Cancer Paternal Grandmother          colon CA    Heart disease Father      Stroke Neg Hx      Hypertension Neg Hx      Diabetes Maternal Uncle      Cancer Cousin Paternal         colon CA       Patient's Medications   New Prescriptions    No medications on file   Previous Medications    CETIRIZINE (ZYRTEC) 10 MG TABLET    Take 10 mg by mouth once daily.    FLUTICASONE (FLONASE) 50 MCG/ACTUATION NASAL SPRAY    2 sprays by Each Nostril route daily as needed.    MELATONIN (MELATIN) 5 MG    Take 1 tablet by mouth every evening.    METOPROLOL SUCCINATE (TOPROL-XL) 25 MG 24 HR TABLET    Take 0.5 tablets (12.5 mg total) by mouth every evening.    MULTIVITAMIN (HONEY BEARS ORAL)    Take by mouth.    TIZANIDINE (ZANAFLEX) 4 MG TABLET    Take 1 tablet (4 mg total) by mouth nightly as needed (muscle spasm).    TURMERIC ORAL    Take by mouth.   Modified Medications    No medications on file   Discontinued Medications    No medications on file       Review of Systems   Constitutional:  Positive for malaise/fatigue.   HENT: Negative.     Eyes: Negative.    Respiratory:  Positive for shortness of breath.    Cardiovascular:  Positive for palpitations. Negative for leg swelling.   Gastrointestinal: Negative.    Genitourinary: Negative.    Musculoskeletal: Negative.    Skin: Negative.    Neurological: Negative.    Endo/Heme/Allergies: Negative.    Psychiatric/Behavioral: Negative.     All 12 systems otherwise negative.      Wt Readings from Last 3 Encounters:   08/05/25 74.2 kg (163 lb 9.3 oz)   06/10/25 73 kg (161 lb)   06/10/25 73 kg (161 lb)     Temp Readings from Last 3 Encounters:   05/12/25 97.3 °F (36.3 °C) (Tympanic)   04/03/25 96.5 °F (35.8 °C) (Tympanic)   03/27/25 96.6 °F (35.9 °C) (Tympanic)     BP Readings from Last 3 Encounters:   08/05/25 100/65  "  06/10/25 116/66   06/10/25 102/66     Pulse Readings from Last 3 Encounters:   08/05/25 65   06/10/25 60   05/12/25 66       /65   Pulse 65   Resp 16   Ht 5' 5" (1.651 m)   Wt 74.2 kg (163 lb 9.3 oz)   LMP 04/29/2003   SpO2 95%   BMI 27.22 kg/m²     Objective:   Physical Exam  Vitals and nursing note reviewed.   Constitutional:       General: She is not in acute distress.     Appearance: She is well-developed. She is not diaphoretic.   HENT:      Head: Normocephalic and atraumatic.      Nose: Nose normal.   Eyes:      General: No scleral icterus.     Conjunctiva/sclera: Conjunctivae normal.   Neck:      Thyroid: No thyromegaly.      Vascular: No JVD.   Cardiovascular:      Rate and Rhythm: Normal rate and regular rhythm.      Heart sounds: S1 normal and S2 normal. No murmur heard.     No friction rub. No gallop. No S3 or S4 sounds.   Pulmonary:      Effort: Pulmonary effort is normal. No respiratory distress.      Breath sounds: Normal breath sounds. No stridor. No wheezing or rales.   Chest:      Chest wall: No tenderness.   Abdominal:      General: Bowel sounds are normal. There is no distension.      Palpations: Abdomen is soft. There is no mass.      Tenderness: There is no abdominal tenderness. There is no rebound.   Genitourinary:     Comments: Deferred  Musculoskeletal:         General: No tenderness or deformity. Normal range of motion.      Cervical back: Normal range of motion and neck supple.   Lymphadenopathy:      Cervical: No cervical adenopathy.   Skin:     General: Skin is warm and dry.      Coloration: Skin is not pale.      Findings: No erythema or rash.   Neurological:      Mental Status: She is alert and oriented to person, place, and time.      Motor: No abnormal muscle tone.      Coordination: Coordination normal.   Psychiatric:         Behavior: Behavior normal.         Thought Content: Thought content normal.         Judgment: Judgment normal.       Lab Results   Component " Value Date     2025     10/03/2024    K 4.5 2025    K 4.3 10/03/2024     2025     10/03/2024    CO2 28 2025    CO2 28 10/03/2024    BUN 18 2025    CREATININE 0.7 2025    GLU 91 2025     10/03/2024    HGBA1C 5.5 2025    AST 22 2025    AST 19 10/03/2024    ALT 15 2025    ALT 19 10/03/2024    ALBUMIN 4.0 2025    ALBUMIN 4.2 10/03/2024    PROT 6.6 2025    PROT 7.4 10/03/2024    BILITOT 0.6 2025    BILITOT 0.5 10/03/2024    WBC 5.57 2025    HGB 12.9 2025    HGB Negative 2025    HGB 14.1 10/03/2024    HCT 40.0 2025    HCT 44.3 10/03/2024    MCV 95 2025    MCV 96 10/03/2024     2025     10/03/2024    INR 1 2025    TSH 1.956 2025    TSH 1.296 2025    TSH 1.207 10/03/2024    CHOL 193 10/03/2024    HDL 76 (H) 10/03/2024    LDLCALC 103.8 10/03/2024    TRIG 66 10/03/2024    BNP 32 2025         BNP (PG/ML)   Date Value   2025 32   2025 49     INR (no units)   Date Value   2025 1          Assessment:      1. Tachycardia    2. Palpitations    3. Chest pain, unspecified type    4. Other form of dyspnea          Plan:     Tachycardia, palpitations with SVT on Apple Watch - improving   - neg CV work up in ER recently  - cont Toprol 12.5 mg QHS   - increase water/liquid IV   -ECHO and Nuc stress 2025 negative with normal EF, PASP 26 mmHg.   -Vital monitor eng 2025  - refer to EP - discuss ablation    2. OA - joints  - cont tx PRN       Thank you for allowing me to participate in this patient's care. Please do not hesitate to contact me with any questions or concerns. Consult note has been forwarded to the referral physician.            [1]   Social History  Tobacco Use    Smoking status: Former     Current packs/day: 0.00     Types: Cigarettes     Quit date: 2008     Years since quittin.1    Smokeless tobacco: Never    Substance Use Topics    Alcohol use: No    Drug use: No

## 2025-08-12 ENCOUNTER — OFFICE VISIT (OUTPATIENT)
Dept: INTERNAL MEDICINE | Facility: CLINIC | Age: 65
End: 2025-08-12
Payer: MEDICARE

## 2025-08-12 VITALS
HEIGHT: 65 IN | WEIGHT: 163.13 LBS | BODY MASS INDEX: 27.18 KG/M2 | OXYGEN SATURATION: 95 % | RESPIRATION RATE: 18 BRPM | SYSTOLIC BLOOD PRESSURE: 96 MMHG | DIASTOLIC BLOOD PRESSURE: 60 MMHG | HEART RATE: 68 BPM | TEMPERATURE: 96 F

## 2025-08-12 DIAGNOSIS — M17.0 PRIMARY OSTEOARTHRITIS OF BOTH KNEES: Primary | ICD-10-CM

## 2025-08-12 DIAGNOSIS — M62.838 MUSCLE SPASM: ICD-10-CM

## 2025-08-12 DIAGNOSIS — E66.3 OVERWEIGHT: ICD-10-CM

## 2025-08-12 PROCEDURE — 99214 OFFICE O/P EST MOD 30 MIN: CPT | Mod: S$PBB,,, | Performed by: FAMILY MEDICINE

## 2025-08-12 PROCEDURE — 99999 PR PBB SHADOW E&M-EST. PATIENT-LVL IV: CPT | Mod: PBBFAC,,, | Performed by: FAMILY MEDICINE

## 2025-08-12 PROCEDURE — 99214 OFFICE O/P EST MOD 30 MIN: CPT | Mod: PBBFAC,PN | Performed by: FAMILY MEDICINE

## 2025-08-12 PROCEDURE — G2211 COMPLEX E/M VISIT ADD ON: HCPCS | Mod: ,,, | Performed by: FAMILY MEDICINE

## 2025-08-12 RX ORDER — TIZANIDINE 4 MG/1
4 TABLET ORAL NIGHTLY PRN
Qty: 30 TABLET | Refills: 5 | Status: SHIPPED | OUTPATIENT
Start: 2025-08-12

## 2025-08-13 ENCOUNTER — APPOINTMENT (OUTPATIENT)
Dept: RADIOLOGY | Facility: HOSPITAL | Age: 65
End: 2025-08-13
Attending: FAMILY MEDICINE
Payer: MEDICARE

## 2025-08-13 DIAGNOSIS — M25.561 BILATERAL CHRONIC KNEE PAIN: ICD-10-CM

## 2025-08-13 DIAGNOSIS — M25.562 BILATERAL CHRONIC KNEE PAIN: ICD-10-CM

## 2025-08-13 DIAGNOSIS — G89.29 BILATERAL CHRONIC KNEE PAIN: ICD-10-CM

## 2025-08-13 PROCEDURE — 73562 X-RAY EXAM OF KNEE 3: CPT | Mod: 26,50,, | Performed by: RADIOLOGY

## 2025-08-13 PROCEDURE — 73562 X-RAY EXAM OF KNEE 3: CPT | Mod: TC,50,PN

## 2025-08-27 ENCOUNTER — OFFICE VISIT (OUTPATIENT)
Dept: CARDIOLOGY | Facility: CLINIC | Age: 65
End: 2025-08-27
Payer: MEDICARE

## 2025-08-27 VITALS
HEIGHT: 65 IN | DIASTOLIC BLOOD PRESSURE: 78 MMHG | OXYGEN SATURATION: 96 % | RESPIRATION RATE: 16 BRPM | HEART RATE: 55 BPM | BODY MASS INDEX: 27.53 KG/M2 | WEIGHT: 165.25 LBS | SYSTOLIC BLOOD PRESSURE: 114 MMHG

## 2025-08-27 DIAGNOSIS — I47.10 SVT (SUPRAVENTRICULAR TACHYCARDIA): Primary | ICD-10-CM

## 2025-08-27 PROCEDURE — 99999 PR PBB SHADOW E&M-EST. PATIENT-LVL IV: CPT | Mod: PBBFAC,,, | Performed by: INTERNAL MEDICINE

## 2025-08-27 PROCEDURE — 99214 OFFICE O/P EST MOD 30 MIN: CPT | Mod: PBBFAC | Performed by: INTERNAL MEDICINE

## 2025-08-27 PROCEDURE — 99204 OFFICE O/P NEW MOD 45 MIN: CPT | Mod: S$PBB,,, | Performed by: INTERNAL MEDICINE
